# Patient Record
Sex: MALE | Race: NATIVE HAWAIIAN OR OTHER PACIFIC ISLANDER | NOT HISPANIC OR LATINO | Employment: FULL TIME | URBAN - METROPOLITAN AREA
[De-identification: names, ages, dates, MRNs, and addresses within clinical notes are randomized per-mention and may not be internally consistent; named-entity substitution may affect disease eponyms.]

---

## 2017-03-28 ENCOUNTER — HOSPITAL ENCOUNTER (EMERGENCY)
Facility: MEDICAL CENTER | Age: 22
End: 2017-03-28
Attending: EMERGENCY MEDICINE

## 2017-03-28 VITALS
BODY MASS INDEX: 42.66 KG/M2 | RESPIRATION RATE: 16 BRPM | HEIGHT: 72 IN | SYSTOLIC BLOOD PRESSURE: 133 MMHG | DIASTOLIC BLOOD PRESSURE: 76 MMHG | OXYGEN SATURATION: 98 % | TEMPERATURE: 97 F | HEART RATE: 100 BPM | WEIGHT: 315 LBS

## 2017-03-28 DIAGNOSIS — K02.9 PAIN DUE TO DENTAL CARIES: ICD-10-CM

## 2017-03-28 PROCEDURE — A9270 NON-COVERED ITEM OR SERVICE: HCPCS | Performed by: EMERGENCY MEDICINE

## 2017-03-28 PROCEDURE — 700102 HCHG RX REV CODE 250 W/ 637 OVERRIDE(OP): Performed by: EMERGENCY MEDICINE

## 2017-03-28 PROCEDURE — 99283 EMERGENCY DEPT VISIT LOW MDM: CPT

## 2017-03-28 RX ORDER — OXYCODONE HYDROCHLORIDE AND ACETAMINOPHEN 5; 325 MG/1; MG/1
2 TABLET ORAL ONCE
Status: COMPLETED | OUTPATIENT
Start: 2017-03-28 | End: 2017-03-28

## 2017-03-28 RX ORDER — OXYCODONE HYDROCHLORIDE AND ACETAMINOPHEN 5; 325 MG/1; MG/1
1-2 TABLET ORAL EVERY 4 HOURS PRN
Qty: 20 TAB | Refills: 0 | Status: SHIPPED | OUTPATIENT
Start: 2017-03-28 | End: 2021-05-14

## 2017-03-28 RX ORDER — PENICILLIN V POTASSIUM 500 MG/1
500 TABLET ORAL ONCE
Status: COMPLETED | OUTPATIENT
Start: 2017-03-28 | End: 2017-03-28

## 2017-03-28 RX ORDER — PENICILLIN V POTASSIUM 500 MG/1
500 TABLET ORAL
Qty: 40 TAB | Refills: 0 | Status: SHIPPED | OUTPATIENT
Start: 2017-03-28 | End: 2017-04-07

## 2017-03-28 RX ADMIN — OXYCODONE HYDROCHLORIDE AND ACETAMINOPHEN 2 TABLET: 5; 325 TABLET ORAL at 11:58

## 2017-03-28 RX ADMIN — PENICILLIN V POTASIUM 500 MG: 500 TABLET OROPHARYNGEAL at 11:58

## 2017-03-28 ASSESSMENT — PAIN SCALES - GENERAL: PAINLEVEL_OUTOF10: 8

## 2017-03-28 NOTE — ED PROVIDER NOTES
ED Provider Note    Scribed for Vladimir Workman M.D. by Tessa Lazcano. 3/28/2017, 11:46 AM.    Primary care provider: Pcp Pt States None  Means of arrival: Walk-in  History obtained from: Patient  History limited by: None    CHIEF COMPLAINT  Chief Complaint   Patient presents with   • Dental Pain     L upper side   • Facial Swelling     HPI  Erin Machado is a 21 y.o. male who presents to the Emergency Department for dental pain to his left upper mouth. Associated symptoms include facial swelling. Per patient, he was hit in the face with a piece of equipment a few years ago which broke his tooth that is currently causing him pain. He denies recent fever.     REVIEW OF SYSTEMS  Pertinent positives include dental pain, facial swelling.   Pertinent negatives include no fever.      PAST MEDICAL HISTORY   No past medical history or chronic illnesses reported.     SURGICAL HISTORY  patient denies any surgical history    SOCIAL HISTORY  Social History   Substance Use Topics   • Smoking status: Current Every Day Smoker -- 0.50 packs/day     Types: Cigarettes   • Alcohol Use: Yes      Comment: occ      History   Drug Use No     FAMILY HISTORY  No family history reported.     CURRENT MEDICATIONS    Current Outpatient Prescriptions on File Prior to Encounter   Medication Sig Dispense Refill   • ibuprofen (MOTRIN) 600 MG Tab Take 1 Tab by mouth every 6 hours as needed. 21 Tab 0   • oxycodone-acetaminophen (PERCOCET) 5-325 MG Tab Take 1-2 Tabs by mouth every four hours as needed (pain). 20 Tab 0      ALLERGIES  No Known Allergies    PHYSICAL EXAM  VITAL SIGNS: /76 mmHg  Pulse 100  Temp(Src) 36.1 °C (97 °F)  Resp 16  Ht 1.829 m (6')  Wt 148.3 kg (326 lb 15.1 oz)  BMI 44.33 kg/m2  SpO2 98%    Constitutional: Awake, alert, in no acute distress, Non-toxic appearance.   HENT: Mucus membranes moist. Oropharynx is clear. Tooth number 10 is fractured at gumline and eroded. Minimal swelling to the left lower eyelid  and left cheek.  Tongue is normal.  Floor of the mouth is normal.  Submental space is soft.  Posterior pharynx is normal. Patient is tolerating secretions without difficulty. There is no evidence of Behzad's Angina.  Eyes: PERRL, EOMI, conjunctiva moist, noninjected. No pain with extraocular eye movement.  Neck: Nontender, Normal range of motion, No nuchal rigidity, No stridor.   Lymphatic: No lymphadenopathy noted.   Cardiovascular: Regular rate and rhythm, no murmurs, rubs, gallops.  Thorax & Lungs: Good breath sounds bilaterally, no wheezes, rales, or retractions.  No chest tenderness.   Abdomen: Soft, nontender, nondistended  Extremities: No edema, No tenderness.   Skin: Warm, Dry, No rashes.   Neurologic: Alert, sensory and motor function normal. No focal deficits.   Psychiatric: Affect normal, Judgment normal, Mood normal. Appropriate for clinical situation    COURSE & MEDICAL DECISION MAKING  Nursing notes, VS, PMSFHx reviewed in chart.    Review of past medical records shows the patient was last seen 02/2015 for shoulder pain.     11:40 AM I have signed into and reviewed the patient's prescription monitoring program data prior to prescribing a scheduled drug.    11:46 AM - Patient seen and examined at bedside. I informed the patient of my plan to prescribe him antibiotics and give him a referral to Dr. Holder, the on-call oral surgeon. He was agreeable. Patient will be treated with 500 mg Veetid and 2 tab Percocet.     DISPOSITION:  Patient will be discharged home in stable condition.    FOLLOW UP:  [facility], Emergency Dept  [address] [address] [phone]    If symptoms worsen    Florencio Holder M.D.  [address]  [facility] [address]  [phone]    Schedule an appointment as soon as possible for a visit  oncall oral surgeon      OUTPATIENT MEDICATIONS:  Discharge Medication List as of 3/28/2017 11:57 AM      START taking these medications    Details    penicillin v potassium (VEETID) 500 MG Tab Take 1 Tab by mouth 4 Times a Day,Before Meals and at Bedtime for 10 days., Disp-40 Tab, R-0, Print Rx Paper      !! oxycodone-acetaminophen (PERCOCET) 5-325 MG Tab Take 1-2 Tabs by mouth every four hours as needed., Disp-20 Tab, R-0, Print Rx Paper       !! - Potential duplicate medications found. Please discuss with provider.        The patient was discharged home with an information sheet on dental caries and told to return immediately for any signs or symptoms listed. The patient agreed to the discharge precautions and follow-up plan which is documented in EPIC.    FINAL IMPRESSION  1. Pain due to dental caries         The note accurately reflects work and decisions made by me.  Vladimir Workman  3/28/2017  3:42 PM

## 2017-03-28 NOTE — LETTER
Emergency Services     March 28, 2017    Patient: Erin Machado   YOB: 1995   Date of Visit: 3/28/2017       To Whom It May Concern:    Erin Machado was seen and treated in our emergency department on 3/28/2017. He is to be excused from work today   .    Sincerely,     MIKEY SERNA M.D.  [facility], EMERGENCY DEPT  Dept: [phone]

## 2017-03-28 NOTE — ED NOTES
"Pt states a couple years ago he broke his tooth at work when he was \"hit in the face by equipment.\" Right above that broken tooth which is just left of the front teeth is where the pain began, it has since radiated upward and he has some periorbital swelling.   "

## 2017-03-28 NOTE — ED AVS SNAPSHOT
TradeTools FX Access Code: [email or web address]  Expires: 4/27/2017 11:57 AM    Your email address is not on file at [facility].  Email Addresses are required for you to sign up for TradeTools FX, please contact [phone] to verify your personal information and to provide your email address prior to attempting to register for TradeTools FX.    Erin Machado  [facility] [place] [address]    TradeTools FX  A secure, online tool to manage your health information     [facility]’s TradeTools FX® is a secure, online tool that connects you to your personalized health information from the privacy of your home -- day or night - making it very easy for you to manage your healthcare. Once the activation process is completed, you can even access your medical information using the TradeTools FX kevan, which is available for free in the Apple Kevan store or Google Play store.     To learn more about TradeTools FX, visit [email or web address]    There are two levels of access available (as shown below):   My Chart Features  [facility] Primary Care Doctor [facility]  Specialists [facility]  Urgent  Care Non-[facility] Primary Care Doctor   Email your healthcare team securely and privately 24/7 X X X    Manage appointments: schedule your next appointment; view details of past/upcoming appointments X      Request prescription refills. X      View recent personal medical records, including lab and immunizations X X X X   View health record, including health history, allergies, medications X X X X   Read reports about your outpatient visits, procedures, consult and ER notes X X X X   See your discharge summary, which is a recap of your hospital and/or ER visit that includes your diagnosis, lab results, and care plan X X  X     How to register for TradeTools FX:  Once your e-mail address has been verified, follow the following steps to sign up for TradeTools FX.     1. Go to  [email or web address]  2. Click on the Sign Up Now box, which takes you to the New Member Sign Up page. You  will need to provide the following information:  a. Enter your Continuum Access Code exactly as it appears at the top of this page. (You will not need to use this code after you’ve completed the sign-up process. If you do not sign up before the expiration date, you must request a new code.)   b. Enter your date of birth.   c. Enter your home email address.   d. Click Submit, and follow the next screen’s instructions.  3. Create a Greenko Groupt ID. This will be your Continuum login ID and cannot be changed, so think of one that is secure and easy to remember.  4. Create a Continuum password. You can change your password at any time.  5. Enter your Password Reset Question and Answer. This can be used at a later time if you forget your password.   6. Enter your e-mail address. This allows you to receive e-mail notifications when new information is available in Continuum.  7. Click Sign Up. You can now view your health information.    For assistance activating your Continuum account, call [phone]

## 2017-03-28 NOTE — ED AVS SNAPSHOT
3/28/2017          Erin Machado  [address] [place] [address]    Dear Erin:    [facility] wants to ensure your discharge home is safe and you or your loved ones have had all your questions answered regarding your care after you leave the hospital.    You may receive a telephone call within two days of your discharge.  This call is to make certain you understand your discharge instructions as well as ensure we provided you with the best care possible during your stay with us.     The call will only last approximately 3-5 minutes and will be done by a nurse.    Once again, we want to ensure your discharge home is safe and that you have a clear understanding of any next steps in your care.  If you have any questions or concerns, please do not hesitate to contact us, we are here for you.  Thank you for choosing [facility] for your healthcare needs.    Sincerely,    Jeet Osorio    [facility]

## 2017-03-28 NOTE — ED AVS SNAPSHOT
Home Care Instructions                                                                                                                Erin Machado   MRN: [id number]    Department:  [facility], Emergency Dept   Date of Visit:  3/28/2017            [facility], Emergency Dept    [address] [address]    Phone:  [phone]      You were seen by     Vladimir Workman M.D.      Your Diagnosis Was     Pain due to dental caries     K02.9       Follow-up Information     1. Follow up with [facility], Emergency Dept.    Specialty:  Emergency Medicine    Why:  If symptoms worsen    Contact information    [address] [address] [phone]        2. Schedule an appointment as soon as possible for a visit with Florencio Holder M.D..    Specialty:  Oral Surgery    Why:  oncall oral surgeon    Contact information    [address]  [facility] [id number] [phone]        Medication Information     Review all of your home medications and newly ordered medications with your primary doctor and/or pharmacist as soon as possible. Follow medication instructions as directed by your doctor and/or pharmacist.     Please keep your complete medication list with you and share with your physician. Update the information when medications are discontinued, doses are changed, or new medications (including over-the-counter products) are added; and carry medication information at all times in the event of emergency situations.               Medication List      START taking these medications        Instructions    Morning Afternoon Evening Bedtime    penicillin v potassium 500 MG Tabs   Commonly known as:  VEETID        Take 1 Tab by mouth 4 Times a Day,Before Meals and at Bedtime for 10 days.   Dose:  500 mg                          ASK your doctor about these medications        Instructions    Morning Afternoon Evening Bedtime    ibuprofen 600  MG Tabs   Commonly known as:  MOTRIN        Take 1 Tab by mouth every 6 hours as needed.   Dose:  600 mg                        * oxycodone-acetaminophen 5-325 MG Tabs   What changed:  Another medication with the same name was added. Make sure you understand how and when to take each.   Commonly known as:  PERCOCET   Ask about: Which instructions should I use?        Take 1-2 Tabs by mouth every four hours as needed (pain).   Dose:  1-2 Tab                        * oxycodone-acetaminophen 5-325 MG Tabs   What changed:  You were already taking a medication with the same name, and this prescription was added. Make sure you understand how and when to take each.   Commonly known as:  PERCOCET   Ask about: Which instructions should I use?        Take 1-2 Tabs by mouth every four hours as needed.   Dose:  1-2 Tab                        * Notice:  This list has 2 medication(s) that are the same as other medications prescribed for you. Read the directions carefully, and ask your doctor or other care provider to review them with you.         Where to Get Your Medications      You can get these medications from any pharmacy     Bring a paper prescription for each of these medications    - oxycodone-acetaminophen 5-325 MG Tabs  - penicillin v potassium 500 MG Tabs              Discharge Instructions       Dental Caries  Dental caries (also called tooth decay) is the most common oral disease. It can occur at any age but is more common in children and young adults.   HOW DENTAL CARIES DEVELOPS   The process of decay begins when bacteria and foods (particularly sugars and starches) combine in your mouth to produce plaque. Plaque is a substance that sticks to the hard, outer surface of a tooth (enamel). The bacteria in plaque produce acids that attack enamel. These acids may also attack the root surface of a tooth (cementum) if it is exposed. Repeated attacks dissolve these surfaces and create holes in the tooth (cavities). If  left untreated, the acids destroy the other layers of the tooth.   RISK FACTORS  · Frequent sipping of sugary beverages.    · Frequent snacking on sugary and starchy foods, especially those that easily get stuck in the teeth.    · Poor oral hygiene.    · Dry mouth.    · Substance abuse such as methamphetamine abuse.    · Broken or poor-fitting dental restorations.    · Eating disorders.    · Gastroesophageal reflux disease (GERD).    · Certain radiation treatments to the head and neck.  SYMPTOMS  In the early stages of dental caries, symptoms are seldom present. Sometimes white, chalky areas may be seen on the enamel or other tooth layers. In later stages, symptoms may include:  · Pits and holes on the enamel.  · Toothache after sweet, hot, or cold foods or drinks are consumed.  · Pain around the tooth.  · Swelling around the tooth.  DIAGNOSIS   Most of the time, dental caries is detected during a regular dental checkup. A diagnosis is made after a thorough medical and dental history is taken and the surfaces of your teeth are checked for signs of dental caries. Sometimes special instruments, such as lasers, are used to check for dental caries. Dental X-ray exams may be taken so that areas not visible to the eye (such as between the contact areas of the teeth) can be checked for cavities.   TREATMENT   If dental caries is in its early stages, it may be reversed with a fluoride treatment or an application of a remineralizing agent at the dental office. Thorough brushing and flossing at home is needed to aid these treatments. If it is in its later stages, treatment depends on the location and extent of tooth destruction:   · If a small area of the tooth has been destroyed, the destroyed area will be removed and cavities will be filled with a material such as gold, silver amalgam, or composite resin.    · If a large area of the tooth has been destroyed, the destroyed area will be removed and a cap (crown) will be  fitted over the remaining tooth structure.    · If the center part of the tooth (pulp) is affected, a procedure called a root canal will be needed before a filling or crown can be placed.    · If most of the tooth has been destroyed, the tooth may need to be pulled (extracted).  HOME CARE INSTRUCTIONS  You can prevent, stop, or reverse dental caries at home by practicing good oral hygiene. Good oral hygiene includes:  · Thoroughly cleaning your teeth at least twice a day with a toothbrush and dental floss.    · Using a fluoride toothpaste. A fluoride mouth rinse may also be used if recommended by your dentist or health care provider.    · Restricting the amount of sugary and starchy foods and sugary liquids you consume.    · Avoiding frequent snacking on these foods and sipping of these liquids.    · Keeping regular visits with a dentist for checkups and cleanings.  PREVENTION   · Practice good oral hygiene.  · Consider a dental sealant. A dental sealant is a coating material that is applied by your dentist to the pits and grooves of teeth. The sealant prevents food from being trapped in them. It may protect the teeth for several years.  · Ask about fluoride supplements if you live in a community without fluorinated water or with water that has a low fluoride content. Use fluoride supplements as directed by your dentist or health care provider.  · Allow fluoride varnish applications to teeth if directed by your dentist or health care provider.     This information is not intended to replace advice given to you by your health care provider. Make sure you discuss any questions you have with your health care provider.     Document Released: 09/09/2003 Document Revised: 01/08/2016 Document Reviewed: 12/20/2013  Sky[employer] Interactive Patient Education ©2016 [employer]            Patient Information     Patient Information    Following emergency treatment: all patient requiring follow-up care must return either to a  private physician or a clinic if your condition worsens before you are able to obtain further medical attention, please return to the emergency room.     Billing Information    At [facility], we work to make the billing process streamlined for our patients.  Our Representatives are here to answer any questions you may have regarding your hospital bill.  If you have insurance coverage and have supplied your insurance information to us, we will submit a claim to your insurer on your behalf.  Should you have any questions regarding your bill, we can be reached online or by phone as follows:  Online: You are able pay your bills online or live chat with our representatives about any billing questions you may have. We are here to help Monday - Friday from 8:00am to 7:30pm and 9:00am - 12:00pm on Saturdays.  Please visit [email or web address]  for more information.   Phone:  [phone] or [phone]    Please note that your emergency physician, surgeon, pathologist, radiologist, anesthesiologist, and other specialists are not employed by [facility] and will therefore bill separately for their services.  Please contact them directly for any questions concerning their bills at the numbers below:     Emergency Physician Services:  [phone]  [facility]:  [phone]  [facility]:  [phone]  [facility]:  [phone]    1. Your final bill may vary from the amount quoted upon discharge if all procedures are not complete at that time, or if your doctor has additional procedures of which we are not aware. You will receive an additional bill if you return to the Emergency Department at [facility] for suture removal regardless of the facility of which the sutures were placed.     2. Please arrange for settlement of this account at the emergency registration.    3. All self-pay accounts are due in full at the time of treatment.  If you  are unable to meet this obligation then payment is expected within 4-5 days.     4. If you have had radiology studies (CT, X-ray, Ultrasound, MRI), you have received a preliminary result during your emergency department visit. Please contact the radiology department [phone] to receive a copy of your final result. Please discuss the Final result with your primary physician or with the follow up physician provided.     Crisis Hotline:  [place] Crisis Hotline:  2-942-KLLTNHJ or 1-676.695.3688  [place] Crisis Hotline:    1-982.589.7839 or 571-072-5616         ED Discharge Follow Up Questions    1. In order to provide you with very good care, we would like to follow up with a phone call in the next few days.  May we have your permission to contact you?     YES /  NO    2. What is the best phone number to call you? (       )_____-__________    3. What is the best time to call you?      Morning  /  Afternoon  /  Evening                   Patient Signature:  ____________________________________________________________    Date:  ____________________________________________________________

## 2017-03-28 NOTE — DISCHARGE INSTRUCTIONS
Dental Caries  Dental caries (also called tooth decay) is the most common oral disease. It can occur at any age but is more common in children and young adults.   HOW DENTAL CARIES DEVELOPS   The process of decay begins when bacteria and foods (particularly sugars and starches) combine in your mouth to produce plaque. Plaque is a substance that sticks to the hard, outer surface of a tooth (enamel). The bacteria in plaque produce acids that attack enamel. These acids may also attack the root surface of a tooth (cementum) if it is exposed. Repeated attacks dissolve these surfaces and create holes in the tooth (cavities). If left untreated, the acids destroy the other layers of the tooth.   RISK FACTORS  · Frequent sipping of sugary beverages.    · Frequent snacking on sugary and starchy foods, especially those that easily get stuck in the teeth.    · Poor oral hygiene.    · Dry mouth.    · Substance abuse such as methamphetamine abuse.    · Broken or poor-fitting dental restorations.    · Eating disorders.    · Gastroesophageal reflux disease (GERD).    · Certain radiation treatments to the head and neck.  SYMPTOMS  In the early stages of dental caries, symptoms are seldom present. Sometimes white, chalky areas may be seen on the enamel or other tooth layers. In later stages, symptoms may include:  · Pits and holes on the enamel.  · Toothache after sweet, hot, or cold foods or drinks are consumed.  · Pain around the tooth.  · Swelling around the tooth.  DIAGNOSIS   Most of the time, dental caries is detected during a regular dental checkup. A diagnosis is made after a thorough medical and dental history is taken and the surfaces of your teeth are checked for signs of dental caries. Sometimes special instruments, such as lasers, are used to check for dental caries. Dental X-ray exams may be taken so that areas not visible to the eye (such as between the contact areas of the teeth) can be checked for cavities.    TREATMENT   If dental caries is in its early stages, it may be reversed with a fluoride treatment or an application of a remineralizing agent at the dental office. Thorough brushing and flossing at home is needed to aid these treatments. If it is in its later stages, treatment depends on the location and extent of tooth destruction:   · If a small area of the tooth has been destroyed, the destroyed area will be removed and cavities will be filled with a material such as gold, silver amalgam, or composite resin.    · If a large area of the tooth has been destroyed, the destroyed area will be removed and a cap (crown) will be fitted over the remaining tooth structure.    · If the center part of the tooth (pulp) is affected, a procedure called a root canal will be needed before a filling or crown can be placed.    · If most of the tooth has been destroyed, the tooth may need to be pulled (extracted).  HOME CARE INSTRUCTIONS  You can prevent, stop, or reverse dental caries at home by practicing good oral hygiene. Good oral hygiene includes:  · Thoroughly cleaning your teeth at least twice a day with a toothbrush and dental floss.    · Using a fluoride toothpaste. A fluoride mouth rinse may also be used if recommended by your dentist or health care provider.    · Restricting the amount of sugary and starchy foods and sugary liquids you consume.    · Avoiding frequent snacking on these foods and sipping of these liquids.    · Keeping regular visits with a dentist for checkups and cleanings.  PREVENTION   · Practice good oral hygiene.  · Consider a dental sealant. A dental sealant is a coating material that is applied by your dentist to the pits and grooves of teeth. The sealant prevents food from being trapped in them. It may protect the teeth for several years.  · Ask about fluoride supplements if you live in a community without fluorinated water or with water that has a low fluoride content. Use fluoride supplements  as directed by your dentist or health care provider.  · Allow fluoride varnish applications to teeth if directed by your dentist or health care provider.     This information is not intended to replace advice given to you by your health care provider. Make sure you discuss any questions you have with your health care provider.     Document Released: 09/09/2003 Document Revised: 01/08/2016 Document Reviewed: 12/20/2013  [employer] Interactive Patient Education ©2016 [employer]

## 2017-03-28 NOTE — ED NOTES
Pt discharged with instructions and script x 2  Pt verbalizes the understanding of instructions. Pt ambulated out of ER without any difficulty. Pt provided work note

## 2017-03-28 NOTE — ED NOTES
Pt ambulates to triage with wife  Chief Complaint   Patient presents with   • Dental Pain     L upper side   • Facial Swelling   slight facial swelling, speech clear, speaks in full sentences  Pt asked to wait in lobby, pt updated on triage process and pt asked to inform RN of any changes.

## 2021-05-14 ENCOUNTER — HOSPITAL ENCOUNTER (EMERGENCY)
Facility: MEDICAL CENTER | Age: 26
End: 2021-05-14
Attending: EMERGENCY MEDICINE

## 2021-05-14 ENCOUNTER — APPOINTMENT (OUTPATIENT)
Dept: RADIOLOGY | Facility: MEDICAL CENTER | Age: 26
End: 2021-05-14
Attending: EMERGENCY MEDICINE

## 2021-05-14 VITALS
DIASTOLIC BLOOD PRESSURE: 72 MMHG | WEIGHT: 315 LBS | OXYGEN SATURATION: 92 % | SYSTOLIC BLOOD PRESSURE: 124 MMHG | RESPIRATION RATE: 19 BRPM | BODY MASS INDEX: 42.66 KG/M2 | HEART RATE: 75 BPM | HEIGHT: 72 IN | TEMPERATURE: 97.3 F

## 2021-05-14 DIAGNOSIS — N23 RENAL COLIC: ICD-10-CM

## 2021-05-14 LAB
ALBUMIN SERPL BCP-MCNC: 4.5 G/DL (ref 3.2–4.9)
ALBUMIN/GLOB SERPL: 1.4 G/DL
ALP SERPL-CCNC: 66 U/L (ref 30–99)
ALT SERPL-CCNC: 32 U/L (ref 2–50)
ANION GAP SERPL CALC-SCNC: 10 MMOL/L (ref 7–16)
APPEARANCE UR: ABNORMAL
AST SERPL-CCNC: 33 U/L (ref 12–45)
BACTERIA #/AREA URNS HPF: NEGATIVE /HPF
BASOPHILS # BLD AUTO: 0.9 % (ref 0–1.8)
BASOPHILS # BLD: 0.05 K/UL (ref 0–0.12)
BILIRUB SERPL-MCNC: 0.5 MG/DL (ref 0.1–1.5)
BILIRUB UR QL STRIP.AUTO: ABNORMAL
BUN SERPL-MCNC: 7 MG/DL (ref 8–22)
CALCIUM SERPL-MCNC: 9.2 MG/DL (ref 8.5–10.5)
CAOX CRY #/AREA URNS HPF: ABNORMAL /HPF
CHLORIDE SERPL-SCNC: 101 MMOL/L (ref 96–112)
CO2 SERPL-SCNC: 27 MMOL/L (ref 20–33)
COLOR UR: ABNORMAL
CREAT SERPL-MCNC: 1.33 MG/DL (ref 0.5–1.4)
EOSINOPHIL # BLD AUTO: 0.34 K/UL (ref 0–0.51)
EOSINOPHIL NFR BLD: 5.9 % (ref 0–6.9)
EPI CELLS #/AREA URNS HPF: NEGATIVE /HPF
ERYTHROCYTE [DISTWIDTH] IN BLOOD BY AUTOMATED COUNT: 43.6 FL (ref 35.9–50)
GLOBULIN SER CALC-MCNC: 3.2 G/DL (ref 1.9–3.5)
GLUCOSE SERPL-MCNC: 106 MG/DL (ref 65–99)
GLUCOSE UR STRIP.AUTO-MCNC: NEGATIVE MG/DL
HCT VFR BLD AUTO: 49.3 % (ref 42–52)
HGB BLD-MCNC: 16.7 G/DL (ref 14–18)
HYALINE CASTS #/AREA URNS LPF: ABNORMAL /LPF
IMM GRANULOCYTES # BLD AUTO: 0.01 K/UL (ref 0–0.11)
IMM GRANULOCYTES NFR BLD AUTO: 0.2 % (ref 0–0.9)
KETONES UR STRIP.AUTO-MCNC: ABNORMAL MG/DL
LEUKOCYTE ESTERASE UR QL STRIP.AUTO: ABNORMAL
LIPASE SERPL-CCNC: 26 U/L (ref 11–82)
LYMPHOCYTES # BLD AUTO: 1.77 K/UL (ref 1–4.8)
LYMPHOCYTES NFR BLD: 30.8 % (ref 22–41)
MCH RBC QN AUTO: 29.8 PG (ref 27–33)
MCHC RBC AUTO-ENTMCNC: 33.9 G/DL (ref 33.7–35.3)
MCV RBC AUTO: 88 FL (ref 81.4–97.8)
MICRO URNS: ABNORMAL
MONOCYTES # BLD AUTO: 0.55 K/UL (ref 0–0.85)
MONOCYTES NFR BLD AUTO: 9.6 % (ref 0–13.4)
NEUTROPHILS # BLD AUTO: 3.03 K/UL (ref 1.82–7.42)
NEUTROPHILS NFR BLD: 52.6 % (ref 44–72)
NITRITE UR QL STRIP.AUTO: NEGATIVE
NRBC # BLD AUTO: 0 K/UL
NRBC BLD-RTO: 0 /100 WBC
PH UR STRIP.AUTO: 5.5 [PH] (ref 5–8)
PLATELET # BLD AUTO: 339 K/UL (ref 164–446)
PMV BLD AUTO: 8.5 FL (ref 9–12.9)
POTASSIUM SERPL-SCNC: 3.8 MMOL/L (ref 3.6–5.5)
PROT SERPL-MCNC: 7.7 G/DL (ref 6–8.2)
PROT UR QL STRIP: 100 MG/DL
RBC # BLD AUTO: 5.6 M/UL (ref 4.7–6.1)
RBC # URNS HPF: ABNORMAL /HPF
RBC UR QL AUTO: ABNORMAL
SODIUM SERPL-SCNC: 138 MMOL/L (ref 135–145)
SP GR UR STRIP.AUTO: 1.03
UROBILINOGEN UR STRIP.AUTO-MCNC: 1 MG/DL
WBC # BLD AUTO: 5.8 K/UL (ref 4.8–10.8)
WBC #/AREA URNS HPF: ABNORMAL /HPF

## 2021-05-14 PROCEDURE — 700105 HCHG RX REV CODE 258: Performed by: EMERGENCY MEDICINE

## 2021-05-14 PROCEDURE — 85025 COMPLETE CBC W/AUTO DIFF WBC: CPT

## 2021-05-14 PROCEDURE — 96374 THER/PROPH/DIAG INJ IV PUSH: CPT

## 2021-05-14 PROCEDURE — 99284 EMERGENCY DEPT VISIT MOD MDM: CPT

## 2021-05-14 PROCEDURE — 700111 HCHG RX REV CODE 636 W/ 250 OVERRIDE (IP): Performed by: EMERGENCY MEDICINE

## 2021-05-14 PROCEDURE — 80053 COMPREHEN METABOLIC PANEL: CPT

## 2021-05-14 PROCEDURE — 96376 TX/PRO/DX INJ SAME DRUG ADON: CPT

## 2021-05-14 PROCEDURE — 74176 CT ABD & PELVIS W/O CONTRAST: CPT

## 2021-05-14 PROCEDURE — 83690 ASSAY OF LIPASE: CPT

## 2021-05-14 PROCEDURE — 96375 TX/PRO/DX INJ NEW DRUG ADDON: CPT

## 2021-05-14 PROCEDURE — 74018 RADEX ABDOMEN 1 VIEW: CPT

## 2021-05-14 PROCEDURE — 81001 URINALYSIS AUTO W/SCOPE: CPT

## 2021-05-14 RX ORDER — ONDANSETRON 2 MG/ML
4 INJECTION INTRAMUSCULAR; INTRAVENOUS
Status: DISCONTINUED | OUTPATIENT
Start: 2021-05-14 | End: 2021-05-14 | Stop reason: HOSPADM

## 2021-05-14 RX ORDER — HYDROMORPHONE HYDROCHLORIDE 1 MG/ML
1 INJECTION, SOLUTION INTRAMUSCULAR; INTRAVENOUS; SUBCUTANEOUS ONCE
Status: COMPLETED | OUTPATIENT
Start: 2021-05-14 | End: 2021-05-14

## 2021-05-14 RX ORDER — OXYCODONE HYDROCHLORIDE AND ACETAMINOPHEN 5; 325 MG/1; MG/1
1 TABLET ORAL EVERY 6 HOURS PRN
Qty: 8 TABLET | Refills: 0 | Status: SHIPPED | OUTPATIENT
Start: 2021-05-14 | End: 2021-05-16

## 2021-05-14 RX ORDER — SODIUM CHLORIDE 9 MG/ML
1000 INJECTION, SOLUTION INTRAVENOUS ONCE
Status: COMPLETED | OUTPATIENT
Start: 2021-05-14 | End: 2021-05-14

## 2021-05-14 RX ORDER — TAMSULOSIN HYDROCHLORIDE 0.4 MG/1
0.4 CAPSULE ORAL DAILY
Qty: 5 CAPSULE | Refills: 0 | Status: SHIPPED | OUTPATIENT
Start: 2021-05-14

## 2021-05-14 RX ORDER — KETOROLAC TROMETHAMINE 30 MG/ML
30 INJECTION, SOLUTION INTRAMUSCULAR; INTRAVENOUS ONCE
Status: COMPLETED | OUTPATIENT
Start: 2021-05-14 | End: 2021-05-14

## 2021-05-14 RX ORDER — MORPHINE SULFATE 4 MG/ML
4 INJECTION, SOLUTION INTRAMUSCULAR; INTRAVENOUS ONCE
Status: COMPLETED | OUTPATIENT
Start: 2021-05-14 | End: 2021-05-14

## 2021-05-14 RX ADMIN — MORPHINE SULFATE 4 MG: 4 INJECTION INTRAVENOUS at 13:57

## 2021-05-14 RX ADMIN — KETOROLAC TROMETHAMINE 30 MG: 30 INJECTION, SOLUTION INTRAMUSCULAR; INTRAVENOUS at 13:05

## 2021-05-14 RX ADMIN — HYDROMORPHONE HYDROCHLORIDE 1 MG: 1 INJECTION, SOLUTION INTRAMUSCULAR; INTRAVENOUS; SUBCUTANEOUS at 15:17

## 2021-05-14 RX ADMIN — MORPHINE SULFATE 4 MG: 4 INJECTION INTRAVENOUS at 13:05

## 2021-05-14 RX ADMIN — SODIUM CHLORIDE 1000 ML: 9 INJECTION, SOLUTION INTRAVENOUS at 13:05

## 2021-05-14 RX ADMIN — ONDANSETRON 4 MG: 2 INJECTION INTRAMUSCULAR; INTRAVENOUS at 13:05

## 2021-05-14 ASSESSMENT — PAIN DESCRIPTION - PAIN TYPE: TYPE: ACUTE PAIN

## 2021-05-14 NOTE — ED TRIAGE NOTES
Chief Complaint   Patient presents with   • Flank Pain     L side x 1 day. constant, sharp        Pt amb to triage with steady gait for above complaint. Hx kidney stones, feels similar but sharper. Denies N/V, fever, chills. Appears in moderate discomfort.     Pt is alert and oriented, speaking in full sentences, follows commands and responds appropriately to questions. Resp are even and unlabored.  Pt placed in lobby. Pt educated on triage process. Pt encouraged to alert staff for any changes.    /94   Pulse 74   Temp 36.3 °C (97.3 °F) (Oral)   Resp (!) 22   Ht 1.829 m (6')   Wt (!) 154 kg (340 lb)   SpO2 97%   BMI 46.11 kg/m²

## 2021-05-14 NOTE — ED NOTES
While labs were being drawn Pt had asked if there was a way that we could provide care without his family member being informed of his condition. Pt was notified that his information can be kept private, that it is his right. Any medical information relayed can be done once we ask family member to step out or that he too is free to do so.

## 2021-05-14 NOTE — ED NOTES
Discussed DC with patient, patient verbalized understanding regarding follow up with urology and medications. Patient A&Ox4 prior to DC.

## 2021-05-14 NOTE — DISCHARGE INSTRUCTIONS
Kidney Stones  (Ureteral Lithiasis)    Drink average fluids, filter the urine saving any stones, take flomax to help pass the stone, use ibuprofen or naproxen and tylenol for pain.  [facility], uncontrolled pain or vomiting or ill appearance.  Followup with urology if not better in 4-5 days.    You had a borderline or high normal blood pressure reading today.  This does not necessarily mean you have hypertension.  Please followup with your/a primary physician for comprehensive blood pressure evaluation and yearly fasting cholesterol assessment.  BP Readings from Last 3 Encounters:   05/14/21 114/71   03/28/17 133/76   12/21/15 124/81         The pain you are experiencing is caused by a stone located in the urinary tract system. These are the channels that collect the urine in your kidneys and deliver it to your bladder. These stones usually pass through the urinary tract and are expelled in the urine. The intense pain is caused by stone movement in those channels when the ureter goes into spasm around the stone.    FOLLOW THE INSTRUCTIONS AS LISTED BELOW:  Increase your fluid intake.  Strain all urine (strainer will be provided). Keep all particulate matter and stones for your caregiver to see. The stone causing the pain may be smaller than a BB.  Take your pain medication as directed.  Make a follow-up appointment with your caregiver as directed.  You may require specialized follow-up x-rays. The absence of pain does not always mean that the stone has passed. It may have just stopped moving. If the urine remains completely obstructed, it can cause loss of kidney function or even complete destruction of the kidney. It is your responsibility that x-rays and follow-ups are completed.  If your stone does not pass on its own, additional measures may be taken by your caregiver to ensure its removal.    seek immediate medical care if:  Pain can not be controlled with the prescribed  medication.  Fever develops. (Backed up urine is more likely to become infected.) You may use ibuprofen and/or acetaminophen for fever until seen by a caregiver.  Pain continues for more than 18 hours.    [employer]® Patient Information ©2007 [employer].

## 2021-05-14 NOTE — ED PROVIDER NOTES
ED Provider Note    Scribed for Jurgen Smith M.D. by Juanis Wiley. 5/14/2021  12:32 PM    Primary care provider: None  Means of arrival: Walk-In  History obtained from: Patient  History limited by: None    CHIEF COMPLAINT  Chief Complaint   Patient presents with   • Flank Pain     L side x 1 day. constant, sharp      HPI  Erin Machado is a 25 y.o. male who presents to the Emergency Department for constant and intense left sided flank pain. The pain is described as sharp, non radiating and he rates it at a 10/10. The patient is unable to sit down secondary to pain. He has had similar flank pain secondary to a past kidney stone, but states it was never this bad in intensity. He states the pain was sudden, and began yesterday afternoon. He denies associated abdominal pain, hematuria, trauma to the region, testicle pain, dysuria, frequent urination, or COVID-19 symptoms. He has no history of kidney infections or UTI.     REVIEW OF SYSTEMS  Pertinent positives include: flank pain.  Pertinent negatives include: abdominal pain, hematuria, trauma to the region, testicle pain, dysuria, frequent urination, or COVID-19 symptoms.  10+ systems reviewed and negative.      PAST MEDICAL HISTORY  Nephrolithiasis    SOCIAL HISTORY  Social History     Tobacco Use   • Smoking status: Current Every Day Smoker     Packs/day: 0.50     Types: Cigarettes   Substance Use Topics   • Alcohol use: Yes     Comment: occ   • Drug use: No     Social History     Substance and Sexual Activity   Drug Use No       SURGICAL HISTORY  Denies    CURRENT MEDICATIONS  Home Medications     Reviewed by Candy Silva R.N. (Registered Nurse) on 05/14/21 at 1157  Med List Status: Not Addressed   Medication Last Dose Status   ibuprofen (MOTRIN) 600 MG Tab  Active   oxycodone-acetaminophen (PERCOCET) 5-325 MG Tab  Active   oxycodone-acetaminophen (PERCOCET) 5-325 MG Tab  Active                ALLERGIES  No Known Allergies    PHYSICAL EXAM  VITAL SIGNS:  /75   Pulse 69   Temp 36.3 °C (97.3 °F) (Oral)   Resp 19   Ht 1.829 m (6')   Wt (!) 154 kg (340 lb)   SpO2 92%   BMI 46.11 kg/m²   Reviewed and afebrile  Constitutional: Well developed, Well nourished,elevated BMI. Restless and Pacing Secondary to Pain.   HENT: Normocephalic, atraumatic, bilateral external ears normal, wearing a mask.   Eyes: PERRLA, conjunctiva pink, no scleral icterus.   Cardiovascular: Regular rate and rhythm. No murmurs, rubs or gallops.  No dependent edema or calf tenderness  Back: No Spinal Tenderness.   Respiratory: Lungs clear to auscultation bilaterally. No wheezes, rales, or rhonchi.  Abdominal:  Abdomen soft, non-tender, non distended. No rebound, or guarding.    Skin: No erythema, no rash.   Genitourinary: No costovertebral angle tenderness.   Musculoskeletal: no deformities.   Neurologic: Alert & oriented x 3, cranial nerves 2-12 intact by passive exam.  No focal deficit noted.  Psychiatric: Affect normal, Judgment normal, Mood normal.     DIFFERENTIAL DIAGNOSIS:  Renal Colic  Radicular pain  Shingles  Pyelonephritis.    RADIOLOGY/PROCEDURES  [id number] VIEW   Final Result      No evidence of bowel obstruction. Nonspecific bowel gas pattern.      CT-RENAL COLIC EVALUATION(A/P W/O)   Final Result      Mild left hydroureteronephrosis with a 2 mm calculus in the distal left ureter.           Radiologist interpretation have been reviewed by me.     LABORATORY:  Results for orders placed or performed during the hospital encounter of 05/14/21   CBC WITH DIFFERENTIAL   Result Value Ref Range    WBC 5.8 4.8 - 10.8 K/uL    RBC 5.60 4.70 - 6.10 M/uL    Hemoglobin 16.7 14.0 - 18.0 g/dL    Hematocrit 49.3 42.0 - 52.0 %    MCV 88.0 81.4 - 97.8 fL    MCH 29.8 27.0 - 33.0 pg    MCHC 33.9 33.7 - 35.3 g/dL    RDW 43.6 35.9 - 50.0 fL    Platelet Count 339 164 - 446 K/uL    MPV 8.5 (L) 9.0 - 12.9 fL    Neutrophils-Polys 52.60 44.00 - 72.00 %    Lymphocytes 30.80 22.00 - 41.00 %    Monocytes  9.60 0.00 - 13.40 %    Eosinophils 5.90 0.00 - 6.90 %    Basophils 0.90 0.00 - 1.80 %    Immature Granulocytes 0.20 0.00 - 0.90 %    Nucleated RBC 0.00 /100 WBC    Neutrophils (Absolute) 3.03 1.82 - 7.42 K/uL    Lymphs (Absolute) 1.77 1.00 - 4.80 K/uL    Monos (Absolute) 0.55 0.00 - 0.85 K/uL    Eos (Absolute) 0.34 0.00 - 0.51 K/uL    Baso (Absolute) 0.05 0.00 - 0.12 K/uL    Immature Granulocytes (abs) 0.01 0.00 - 0.11 K/uL    NRBC (Absolute) 0.00 K/uL   COMP METABOLIC PANEL   Result Value Ref Range    Sodium 138 135 - 145 mmol/L    Potassium 3.8 3.6 - 5.5 mmol/L    Chloride 101 96 - 112 mmol/L    Co2 27 20 - 33 mmol/L    Anion Gap 10.0 7.0 - 16.0    Glucose 106 (H) 65 - 99 mg/dL    Bun 7 (L) 8 - 22 mg/dL    Creatinine 1.33 0.50 - 1.40 mg/dL    Calcium 9.2 8.5 - 10.5 mg/dL    AST(SGOT) 33 12 - 45 U/L    ALT(SGPT) 32 2 - 50 U/L    Alkaline Phosphatase 66 30 - 99 U/L    Total Bilirubin 0.5 0.1 - 1.5 mg/dL    Albumin 4.5 3.2 - 4.9 g/dL    Total Protein 7.7 6.0 - 8.2 g/dL    Globulin 3.2 1.9 - 3.5 g/dL    A-G Ratio 1.4 g/dL   LIPASE   Result Value Ref Range    Lipase 26 11 - 82 U/L   URINALYSIS    Specimen: Urine   Result Value Ref Range    Color DK Yellow     Character Cloudy (A)     Specific Gravity 1.030 <1.035    Ph 5.5 5.0 - 8.0    Glucose Negative Negative mg/dL    Ketones Trace (A) Negative mg/dL    Protein 100 (A) Negative mg/dL    Bilirubin Small (A) Negative    Urobilinogen, Urine 1.0 Negative    Nitrite Negative Negative    Leukocyte Esterase Trace (A) Negative    Occult Blood Large (A) Negative    Micro Urine Req Microscopic    URINE MICROSCOPIC (W/UA)   Result Value Ref Range    WBC 5-10 (A) /hpf    RBC 20-50 (A) /hpf    Bacteria Negative None /hpf    Epithelial Cells Negative /hpf    Ca Oxalate Crystal Few /hpf    Hyaline Cast 3-5 (A) /lpf   Lab results reviewed by me.     INTERVENTIONS:Indications IV Fluid: Kidney Stones  Medications   ondansetron (ZOFRAN) syringe/vial injection 4 mg (4 mg Intravenous  Given 5/14/21 1305)   HYDROmorphone (Dilaudid) injection 1 mg (has no administration in time range)   NS infusion 1,000 mL (0 mL Intravenous Stopped 5/14/21 1505)   ketorolac (TORADOL) injection 30 mg (30 mg Intravenous Given 5/14/21 1305)   morphine (pf) 4 mg/mL injection 4 mg (4 mg Intravenous Given 5/14/21 1305)   morphine (pf) 4 mg/mL injection 4 mg (4 mg Intravenous Given 5/14/21 1357)     Response:improved pain.    ED COURSE:  Nursing notes, VS, PMSFHx reviewed in chart.     12:32 PM - Patient seen and examined at bedside. I verified that the patient was wearing a mask and I was wearing appropriate PPE every time I entered the room. The patient's mask was on the patient at all times during my encounter except for a brief view of the oropharynx. Patient will be treated with NS infusion, Toradol, morphine, and Zofran for his symptoms. Ordered labs and imaging to evaluate.     1:49 PM- I reevaluated the patient at this time and informed him that evidence of a kidney stone was found on imaging. He still endorses pain, and requests more medication for pain. I informed him that we would need a urine sample moving forward.     3:05 PM- I reevaluated the patient and informed him of discharge instructions. I informed him there was no need for hospitalization and the patient gave verbal agreement. I allowed the patient time to ask questions and voice concerns. He informed me that he is still experiencing pain. I will administer Dilaudid and discharge with Percocet, upon patient's request.   NARC score is 0    MEDICAL DECISION MAKING:  Appearing patient presents with severe left flank pain and has renal colic with ureterolithiasis.  The stone is small and should pass on its own.  There is no evidence of pyelonephritis.  Nephric abscess renal insufficiency or sepsis.    PLAN:  Discharge Medication List as of 5/14/2021  3:14 PM      START taking these medications    Details   tamsulosin (FLOMAX) 0.4 MG capsule Take 1  capsule by mouth every day., Disp-5 capsule, R-0, Normal         Urine filter  Kidney stone handout given  Return for new or worsening symptoms    Prescription monitoring queried and score 0  Opiate risk tool utilized and patient low risk  Informed consent obtained for opiate analgesic  Indication opiate analgesic severe renal colic pain    Elvin Carrillo M.D.  [address]  [place] [id number]  [phone]    Schedule an appointment as soon as possible for a visit   As needed if not better 4-5 days    CONDITION: stable.     FINAL IMPRESSION  1. Renal colic          Juanis KEITH (Nelida), am scribing for, and in the presence of, Jurgen Smith M.D..    Electronically signed by: Juanis Wiley (Nelida), 5/14/2021    Jurgen KEITH M.D. personally performed the services described in this documentation, as scribed by Juanis Wiley in my presence, and it is both accurate and complete.    The note accurately reflects work and decisions made by me.  Jurgen Smith M.D.  5/14/2021  4:58 PM